# Patient Record
Sex: FEMALE | Race: WHITE | Employment: OTHER | RURAL
[De-identification: names, ages, dates, MRNs, and addresses within clinical notes are randomized per-mention and may not be internally consistent; named-entity substitution may affect disease eponyms.]

---

## 2020-01-28 ENCOUNTER — HOSPITAL ENCOUNTER (OUTPATIENT)
Dept: NON INVASIVE DIAGNOSTICS | Facility: HOSPITAL | Age: 55
Discharge: HOME | End: 2020-01-28
Payer: COMMERCIAL

## 2020-01-28 ENCOUNTER — OUTSIDE FACILITY SERVICE (OUTPATIENT)
Dept: CARDIOLOGY | Facility: CLINIC | Age: 55
End: 2020-01-28

## 2020-01-28 LAB
LV EF: 60 %
LVEF MODALITY: NORMAL

## 2020-01-28 PROCEDURE — 93306 TTE W/DOPPLER COMPLETE: CPT | Performed by: INTERNAL MEDICINE

## 2020-01-28 PROCEDURE — 93306 TTE W/DOPPLER COMPLETE: CPT

## 2020-02-03 ENCOUNTER — HOSPITAL ENCOUNTER (OUTPATIENT)
Dept: GENERAL RADIOLOGY | Facility: HOSPITAL | Age: 55
Discharge: HOME | End: 2020-02-03
Payer: COMMERCIAL

## 2020-02-03 ENCOUNTER — HOSPITAL ENCOUNTER (OUTPATIENT)
Dept: MAMMOGRAPHY | Facility: HOSPITAL | Age: 55
Discharge: HOME | End: 2020-02-03
Payer: COMMERCIAL

## 2020-02-03 PROCEDURE — 77063 BREAST TOMOSYNTHESIS BI: CPT

## 2020-02-03 PROCEDURE — 77080 DXA BONE DENSITY AXIAL: CPT

## 2020-02-11 ENCOUNTER — HOSPITAL ENCOUNTER (OUTPATIENT)
Dept: ULTRASOUND IMAGING | Facility: HOSPITAL | Age: 55
Discharge: HOME | End: 2020-02-11
Payer: COMMERCIAL

## 2020-02-11 ENCOUNTER — HOSPITAL ENCOUNTER (OUTPATIENT)
Dept: MAMMOGRAPHY | Facility: HOSPITAL | Age: 55
Discharge: HOME | End: 2020-02-11
Payer: COMMERCIAL

## 2020-02-11 PROCEDURE — G0279 TOMOSYNTHESIS, MAMMO: HCPCS

## 2020-02-11 PROCEDURE — 76642 ULTRASOUND BREAST LIMITED: CPT

## 2020-02-13 ENCOUNTER — OFFICE VISIT (OUTPATIENT)
Dept: SURGERY | Facility: CLINIC | Age: 55
End: 2020-02-13

## 2020-02-13 VITALS
BODY MASS INDEX: 38.49 KG/M2 | WEIGHT: 231 LBS | TEMPERATURE: 99 F | DIASTOLIC BLOOD PRESSURE: 68 MMHG | SYSTOLIC BLOOD PRESSURE: 124 MMHG | HEIGHT: 65 IN | OXYGEN SATURATION: 97 % | HEART RATE: 87 BPM

## 2020-02-13 DIAGNOSIS — R92.8 ABNORMAL MAMMOGRAM: Primary | ICD-10-CM

## 2020-02-13 PROCEDURE — 99204 OFFICE O/P NEW MOD 45 MIN: CPT | Performed by: SURGERY

## 2020-02-13 RX ORDER — PREGABALIN 50 MG/1
CAPSULE ORAL
COMMUNITY
Start: 2020-01-28

## 2020-02-13 RX ORDER — BUPROPION HYDROCHLORIDE 300 MG/1
TABLET ORAL
COMMUNITY
Start: 2020-02-06

## 2020-02-13 RX ORDER — ESCITALOPRAM OXALATE 10 MG/1
TABLET ORAL
COMMUNITY
Start: 2020-01-16

## 2020-02-13 RX ORDER — PROPRANOLOL HYDROCHLORIDE 10 MG/1
TABLET ORAL
COMMUNITY
Start: 2020-01-28

## 2020-02-13 RX ORDER — CYANOCOBALAMIN (VITAMIN B-12) 3000MCG/ML
DROPS SUBLINGUAL
COMMUNITY

## 2020-02-13 RX ORDER — MIRTAZAPINE 15 MG/1
TABLET, FILM COATED ORAL
COMMUNITY
Start: 2020-01-28

## 2020-02-13 NOTE — PROGRESS NOTES
Subjective   Crista Piedra is a 55 y.o. female.   Chief Complaint   Patient presents with   • Mass     right breast       History of Present Illness   Ms. Piedra is a 55-year-old female patient who comes the office today for evaluation after having a recent mammogram which was abnormal.  This demonstrated a 5 x 6 x 2 mm hypoechoic, oval mass in the right breast.  The patient is asymptomatic.  She denies palpable lumps, breast pain, nipple discharge, nipple inversion, skin changes, or axillary lymphadenopathy.  The patient's recent breast imaging was BI-RADS 4.  This was performed at [facility].  She was sent for needle biopsy of the abnormality.  She has never had a previous biopsy.  The patient is a never smoker.  She has no family history of breast cancer.  She is not currently breast-feeding.  She is status post hysterectomy.  He is not on exogenous hormone therapy.    The following portions of the patient's history were reviewed and updated as appropriate: allergies, current medications, past family history, past medical history, past social history, past surgical history and problem list.      There is no problem list on file for this patient.      Past Medical History:   Diagnosis Date   • Hypertension        Past Surgical History:   Procedure Laterality Date   • HYSTERECTOMY     • TUBAL ABDOMINAL LIGATION         Medications:     Current Outpatient Medications:   •  buPROPion XL (WELLBUTRIN XL) 300 MG 24 hr tablet, , Disp: , Rfl:   •  Cholecalciferol (VITAMIN D3) 125 MCG (5000 UT) capsule capsule, Take 5,000 Units by mouth Daily., Disp: , Rfl:   •  Cyanocobalamin (VITAMIN B12) 3000 MCG/ML liquid, Place  under the tongue., Disp: , Rfl:   •  escitalopram (LEXAPRO) 10 MG tablet, , Disp: , Rfl:   •  mirtazapine (REMERON) 15 MG tablet, , Disp: , Rfl:   •  pregabalin (LYRICA) 50 MG capsule, , Disp: , Rfl:   •  propranolol (INDERAL) 10 MG tablet, , Disp: , Rfl:     Allergies:   Allergies   Allergen  "Reactions   • Penicillins Rash         Family History   Problem Relation Age of Onset   • No Known Problems Mother    • Cancer Father    • Diabetes Father    • Hypertension Father    • Hypertension Sister    • Diabetes Sister    • Hypertension Brother        Social History     Socioeconomic History   • Marital status:      Spouse name: Not on file   • Number of children: Not on file   • Years of education: Not on file   • Highest education level: Not on file   Tobacco Use   • Smoking status: Never Smoker   • Smokeless tobacco: Never Used   Substance and Sexual Activity   • Alcohol use: Never     Frequency: Never   • Drug use: Never   • Sexual activity: Defer       Review of Systems   Constitutional: Negative for chills, fever and unexpected weight change.   HENT: Negative for hearing loss, trouble swallowing and voice change.    Eyes: Negative for visual disturbance.   Respiratory: Negative for apnea, cough, chest tightness, shortness of breath and wheezing.    Cardiovascular: Negative for chest pain, palpitations and leg swelling.   Gastrointestinal: Negative for abdominal distention, abdominal pain, anal bleeding, blood in stool, constipation, diarrhea, nausea, rectal pain and vomiting.   Endocrine: Negative for cold intolerance and heat intolerance.   Genitourinary: Negative for difficulty urinating, dysuria and flank pain.   Musculoskeletal: Negative for back pain and gait problem.   Skin: Negative for color change, rash and wound.   Neurological: Negative for dizziness, syncope, speech difficulty, weakness, light-headedness, numbness and headaches.   Hematological: Negative for adenopathy. Does not bruise/bleed easily.   Psychiatric/Behavioral: Negative for confusion. The patient is not nervous/anxious.        Objective    /68   Pulse 87   Temp 99 °F (37.2 °C) (Temporal)   Ht 165.1 cm (65\")   Wt 105 kg (231 lb)   SpO2 97%   BMI 38.44 kg/m²     Physical Exam   Constitutional: She is oriented " to person, place, and time. She appears well-developed and well-nourished.   HENT:   Head: Normocephalic and atraumatic.   Cardiovascular: Regular rhythm.   Pulmonary/Chest: Effort normal. Right breast exhibits no inverted nipple, no mass, no nipple discharge, no skin change and no tenderness. Left breast exhibits no inverted nipple, no mass, no nipple discharge, no skin change and no tenderness. No breast swelling, tenderness, discharge or bleeding. Breasts are symmetrical.   Lymphadenopathy:     She has no cervical adenopathy.   Neurological: She is alert and oriented to person, place, and time.   Skin: Skin is warm and dry.   Psychiatric: She has a normal mood and affect. Her behavior is normal.     Outside Records:  I have taken the opportunity to review the patient's available outside records in paper format, scanned format and those available on Care Everywhere    Results Review:  I have reviewed the entirety of the patient's clinical lab results.  I have also personally reviewed the relevant patient imaging and a disc provided by the patient.  The report is included below    Hypoechoic mass right breast at 10:00, 5 cm from the nipple. Ultrasound-guided core biopsy would be suggested.    ASSESSMENT: BI-RADS 4B Moderate Suspicion  RECOMMENDATION: Consultation recommended within 30 days.        Patient was entered into a reminder system for annual screening mammography notification.    This study was performed and interpreted using Full Field Digital Mammography and Computer Aided Detection.    Please note that mammography is not 100% accurate in diagnosing breast cancer.  A routine breast exam is recommended to correlate with mammographic findings.    Any palpable abnormality must be assessed clinically.  If the patient has a new palpable abnormality, then a Diagnostic Mammogram and/or Breast Ultrasound is indicated if clinically appropriate.   Result Narrative   DATE: 2/10/2020    EXAM: [facility] DIGITAL  DIAGNOSTIC UNILATERAL RIGHT, US BREAST LIMITED RIGHT    COMPARISON: February 3    INDICATION: Abnormal mammogram    FINDINGS:    Tomosynthesis: Yes    The breasts have scattered areas of fibroglandular density.    Limitations of exam: No significant limitations.    Additional views right breast demonstrate persistent small asymmetry/mass within the lateral breast, mid depth.    Ultrasound evaluation of the right breast at 10:00, 5 cm from the nipple, demonstrates hypoechoic oval mass, wider than tall, approximately 5 x 6 x 2 mm in size.    Ultrasound evaluation of the right axilla demonstrates normal-appearing lymph nodes, without enlargement or cortical thickening..   Other Result Information   Shankar, [facility] Incoming Radiology Results From [employer] - 02/11/2020  2:43 PM EST  DATE: 2/10/2020    EXAM: [facility] DIGITAL DIAGNOSTIC UNILATERAL RIGHT, US BREAST LIMITED RIGHT    COMPARISON: February 3    INDICATION: Abnormal mammogram    FINDINGS:    Tomosynthesis: Yes    The breasts have scattered areas of fibroglandular density.    Limitations of exam: No significant limitations.    Additional views right breast demonstrate persistent small asymmetry/mass within the lateral breast, mid depth.    Ultrasound evaluation of the right breast at 10:00, 5 cm from the nipple, demonstrates hypoechoic oval mass, wider than tall, approximately 5 x 6 x 2 mm in size.    Ultrasound evaluation of the right axilla demonstrates normal-appearing lymph nodes, without enlargement or cortical thickening..    IMPRESSION:    Hypoechoic mass right breast at 10:00, 5 cm from the nipple. Ultrasound-guided core biopsy would be suggested.    ASSESSMENT: BI-RADS 4B Moderate Suspicion  RECOMMENDATION: Consultation recommended within 30 days.        Patient was entered into a reminder system for annual screening mammography notification.    This study was performed and interpreted using Full Field Digital Mammography and Computer Aided  Detection.    Please note that mammography is not 100% accurate in diagnosing breast cancer.  A routine breast exam is recommended to correlate with mammographic findings.    Any palpable abnormality must be assessed clinically.  If the patient has a new palpable abnormality, then a Diagnostic Mammogram and/or Breast Ultrasound is indicated if clinically appropriate.       Assessment/Plan   Crista was seen today for mass.    Diagnoses and all orders for this visit:    Abnormal mammogram    Ms. Piedra is a 55-year-old female patient with a recent abnormal mammogram from an outside hospital.  On my review of the provided images, the abnormality in question appears more consistent with either a prominent fat lobule in the breast based on the ultrasound appearance, or perhaps an intramammary lymph node based on the mammographic appearance.  We did perform a limited ultrasound in the office today in preparation for a needle biopsy, and we were unable to re-identify the lesion described on the outside ultrasound.  As such, I counseled the patient regarding this at length.  I have advised her that I will have our radiologist over read these films and then I will follow-up with her regarding the neck steps in management.  I suspect that our radiologist would not feel that this area is as suspicious as the outside radiologist.  If that proves to be the case, as I suspect it will, I would recommend short interval follow-up in 3 to 6 months with repeat imaging.  Obviously, we cannot go forward with a biopsy when the lesion in question cannot be identified.  She understands this, and will follow-up with us via phone.

## 2020-02-27 ENCOUNTER — TELEPHONE (OUTPATIENT)
Dept: SURGERY | Facility: CLINIC | Age: 55
End: 2020-02-27

## 2020-02-27 NOTE — TELEPHONE ENCOUNTER
Patient was notified and would like to have imaging ordered.     ----- Message from Olamide Cronin MD sent at 2/26/2020 11:33 PM EST -----  Please call this patient and inform her that I was able to review her mammogram and ultrasound with our radiologist.  He does not think that the area in question on her recent breast imaging is suspicious, as previously reported at the outside facility.  He feels it is more likely a lymph node contained within the breast, which is a totally normal finding.  He reviewed our in office ultrasound as well and cannot identify this lesion on the ultrasound we did to attempt biopsy.  He agrees with my recommendation for repeat breast imaging in 3 to 6 months at our facility.  If she is agreeable, I am happy to order those studies and follow-up with her once they have been completed.

## 2020-05-07 DIAGNOSIS — N63.0 BREAST MASS: Primary | ICD-10-CM

## 2020-06-05 ENCOUNTER — HOSPITAL ENCOUNTER (OUTPATIENT)
Dept: MAMMOGRAPHY | Facility: HOSPITAL | Age: 55
Discharge: HOME | End: 2020-06-05
Admitting: SURGERY

## 2020-06-05 ENCOUNTER — HOSPITAL ENCOUNTER (OUTPATIENT)
Dept: ULTRASOUND IMAGING | Facility: HOSPITAL | Age: 55
Discharge: HOME | End: 2020-06-05

## 2020-06-05 DIAGNOSIS — N63.0 BREAST MASS: ICD-10-CM

## 2020-06-05 PROCEDURE — 77065 DX MAMMO INCL CAD UNI: CPT

## 2020-06-05 PROCEDURE — G0279 TOMOSYNTHESIS, MAMMO: HCPCS

## 2020-06-05 PROCEDURE — 76642 ULTRASOUND BREAST LIMITED: CPT

## 2021-01-27 ENCOUNTER — TELEPHONE (OUTPATIENT)
Dept: SURGERY | Facility: CLINIC | Age: 56
End: 2021-01-27

## 2021-01-27 NOTE — TELEPHONE ENCOUNTER
Called patient to see if she wanted to reschedule her appointment she canceled with Dr Cronin no answer,had to leave voice message for her to call office.

## 2021-04-21 ENCOUNTER — TELEPHONE (OUTPATIENT)
Dept: SURGERY | Facility: CLINIC | Age: 56
End: 2021-04-21

## 2021-04-21 NOTE — TELEPHONE ENCOUNTER
Called patient concerning follow up breast appointment that she canceled with Dr Cronin. Patient stated her last  mammo came back fine that she is due another mammo in June 2021. Patient asks if mammo comes back ok does need to follow up with Dr Cronin ?

## 2021-04-26 NOTE — TELEPHONE ENCOUNTER
This patient was actually due for a 6 month follow up mammogram, in January, which does not seem to have been done.  At this point, she should keep her appointment for her upcoming mammogram and can follow up with me once it has been completed.

## 2021-05-14 ENCOUNTER — TELEPHONE (OUTPATIENT)
Dept: SURGERY | Facility: CLINIC | Age: 56
End: 2021-05-14

## 2021-05-14 NOTE — TELEPHONE ENCOUNTER
Patient was called about appointment that was on 06/18/20. The patient did not want to reschedule at this time.

## 2024-09-25 RX ORDER — DICLOFENAC SODIUM 75 MG/1
75 TABLET, DELAYED RELEASE ORAL DAILY
Qty: 90 TABLET | Refills: 0 | OUTPATIENT
Start: 2024-09-25